# Patient Record
(demographics unavailable — no encounter records)

---
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
## 2019-11-22 NOTE — EMERGENCY DEPARTMENT REPORT
<ANUSHA MCMAHON - Last Filed: 11/22/19 14:35>





ED Psych HPI





- General


Chief Complaint: Psych


Stated Complaint: PYSCH EVAL


Time Seen by Provider: 11/22/19 12:01





- Related Data


                                    Allergies











Allergy/AdvReac Type Severity Reaction Status Date / Time


 


No Known Allergies Allergy   Unverified 11/22/19 11:23














ED Medical Decision Making





- Lab Data


Result diagrams: 


                                 11/22/19 12:21





                                 11/22/19 12:21





- Medical Decision Making


I discussed case with my colleague.  I am concerned for undiagnosed mental 

illness with previous suicide attempt, recent incarceration, homelessness, 

current SI/HI and hallucinations  differential diagnosis includes drug-induced 

psychosis, schizophrenia, depressive disorder with psychosis he is medically 

clear for psychiatric care.  I reviewed the labs obtained.





ED Disposition


Clinical Impression: 


 Suicidal thoughts, Homicidal thoughts, Hallucinations





Disposition: DC/TX-65 PSY HOSP/PSY UNIT


Condition: Stable





<MICHEL DAVILA - Last Filed: 11/22/19 15:19>





ED Psych HPI





- General


Source: patient, family


Mode of arrival: Ambulatory





- History of Present Illness


Initial Comments: 





20-year-old -American male patient without significant past medical 

history presents with complaints of SI/HI for more than 6 months.  She now 

admits to auditory and visual also.  He denies any specific plan of suicide or 

homicide and denies any particular person he wishes to harm.  Patient admits to 

suicidal attempt 2 years ago.  He denies any previous mental health history or 

being on medications.





ED Review of Systems


ROS: 


Stated complaint: PYSCH EVAL


Other details as noted in HPI





Constitutional: denies: chills, fever


Eyes: denies: eye pain, vision change


ENT: denies: ear pain


Respiratory: denies: cough, shortness of breath


Cardiovascular: denies: chest pain


Gastrointestinal: denies: abdominal pain, diarrhea


Genitourinary: denies: dysuria, frequency, hematuria


Musculoskeletal: denies: back pain


Neurological: denies: headache, weakness, numbness, paresthesias, confusion, 

abnormal gait


Psychiatric: depression, auditory hallucinations, visual hallucinations, 

homicidal thoughts, suicidal thoughts





ED Past Medical Hx





- Past Medical History


Previous Medical History?: No





- Surgical History


Past Surgical History?: No





- Social History


Smoking Status: Current Every Day Smoker


Substance Use Type: Alcohol





ED Physical Exam





- General


Limitations: No Limitations





- Head


Head exam: Present: atraumatic, normocephalic





- Eye


Eye exam: Present: normal appearance, PERRL





- Neck


Neck exam: Present: full ROM.  Absent: tenderness





- Respiratory


Respiratory exam: Present: normal lung sounds bilaterally.  Absent: respiratory 

distress





- Cardiovascular


Cardiovascular Exam: Present: regular rate, normal rhythm.  Absent: systolic 

murmur, diastolic murmur, rubs, gallop





- GI/Abdominal


GI/Abdominal exam: Present: soft.  Absent: distended, tenderness





- Rectal


Rectal exam: Present: deferred





- Extremities Exam


Extremities exam: Absent: pedal edema





- Back Exam


Back exam: Present: full ROM





- Neurological Exam


Neurological exam: Present: alert, oriented X3, normal gait





- Psychiatric


Psychiatric exam: Present: homicidal ideation, suicidal ideation.  Absent: 

normal affect (inappropriate laughing noted)





- Skin


Skin exam: Present: warm, dry, intact, normal color.  Absent: rash, cyanosis, 

diaphoretic





ED Course


                                   Vital Signs











  11/22/19 11/22/19 11/22/19





  11:31 11:33 13:00


 


Temperature 98.2 F  98.4 F


 


Pulse Rate 91 H  72


 


Respiratory 20  18





Rate   


 


Blood Pressure 159/85  


 


Blood Pressure   142/75





[Left]   


 


O2 Sat by Pulse  100 100





Oximetry   














ED Medical Decision Making





- Lab Data


Result diagrams: 


                                 11/22/19 12:21





                                 11/22/19 12:21








                                   Lab Results











  11/22/19 11/22/19 11/22/19 Range/Units





  12:09 12:09 12:21 


 


WBC     (4.5-11.0)  K/mm3


 


RBC     (3.65-5.03)  M/mm3


 


Hgb     (11.8-15.2)  gm/dl


 


Hct     (35.5-45.6)  %


 


MCV     (84-94)  fl


 


MCH     (28-32)  pg


 


MCHC     (32-34)  %


 


RDW     (13.2-15.2)  %


 


Plt Count     (140-440)  K/mm3


 


Sodium    140  (137-145)  mmol/L


 


Potassium    3.4 L  (3.6-5.0)  mmol/L


 


Chloride    105.3  ()  mmol/L


 


Carbon Dioxide    21 L  (22-30)  mmol/L


 


Anion Gap    17  mmol/L


 


BUN    14  (9-20)  mg/dL


 


Creatinine    1.0  (0.8-1.5)  mg/dL


 


Estimated GFR    > 60  ml/min


 


BUN/Creatinine Ratio    14  %


 


Glucose    102 H  ()  mg/dL


 


Calcium    9.3  (8.4-10.2)  mg/dL


 


Total Bilirubin    0.30  (0.1-1.2)  mg/dL


 


AST    34  (5-40)  units/L


 


ALT    29  (7-56)  units/L


 


Alkaline Phosphatase    90  ()  units/L


 


Total Protein    7.2  (6.3-8.2)  g/dL


 


Albumin    4.1  (3.9-5)  g/dL


 


Albumin/Globulin Ratio    1.3  %


 


Urine Color  Colorless    (Yellow)  


 


Urine Turbidity  Clear    (Clear)  


 


Urine pH  7.0    (5.0-7.0)  


 


Ur Specific Gravity  1.002 L    (1.003-1.030)  


 


Urine Protein  <15 mg/dl    (Negative)  mg/dL


 


Urine Glucose (UA)  Neg    (Negative)  mg/dL


 


Urine Ketones  Neg    (Negative)  mg/dL


 


Urine Blood  Neg    (Negative)  


 


Urine Nitrite  Neg    (Negative)  


 


Urine Bilirubin  Neg    (Negative)  


 


Urine Urobilinogen  < 2.0    (<2.0)  mg/dL


 


Ur Leukocyte Esterase  Tr    (Negative)  


 


Urine WBC (Auto)  2.0    (0.0-6.0)  /HPF


 


Urine RBC (Auto)  1.0    (0.0-6.0)  /HPF


 


Urine Bacteria (Auto)  1+    (Negative)  /HPF


 


Salicylates     (2.8-20.0)  mg/dL


 


Urine Opiates Screen   Presumptive negative   


 


Urine Methadone Screen   Presumptive negative   


 


Acetaminophen     (10.0-30.0)  ug/mL


 


Ur Barbiturates Screen   Presumptive negative   


 


Ur Phencyclidine Scrn   Presumptive negative   


 


Ur Amphetamines Screen   Presumptive negative   


 


U Benzodiazepines Scrn   Presumptive negative   


 


Urine Cocaine Screen   Presumptive negative   


 


U Marijuana (THC) Screen   Presumptive negative   


 


Drugs of Abuse Note   Disclamer   


 


Plasma/Serum Alcohol     (0-0.07)  %














  11/22/19 11/22/19 11/22/19 Range/Units





  12:21 12:21 12:21 


 


WBC     (4.5-11.0)  K/mm3


 


RBC     (3.65-5.03)  M/mm3


 


Hgb     (11.8-15.2)  gm/dl


 


Hct     (35.5-45.6)  %


 


MCV     (84-94)  fl


 


MCH     (28-32)  pg


 


MCHC     (32-34)  %


 


RDW     (13.2-15.2)  %


 


Plt Count     (140-440)  K/mm3


 


Sodium     (137-145)  mmol/L


 


Potassium     (3.6-5.0)  mmol/L


 


Chloride     ()  mmol/L


 


Carbon Dioxide     (22-30)  mmol/L


 


Anion Gap     mmol/L


 


BUN     (9-20)  mg/dL


 


Creatinine     (0.8-1.5)  mg/dL


 


Estimated GFR     ml/min


 


BUN/Creatinine Ratio     %


 


Glucose     ()  mg/dL


 


Calcium     (8.4-10.2)  mg/dL


 


Total Bilirubin     (0.1-1.2)  mg/dL


 


AST     (5-40)  units/L


 


ALT     (7-56)  units/L


 


Alkaline Phosphatase     ()  units/L


 


Total Protein     (6.3-8.2)  g/dL


 


Albumin     (3.9-5)  g/dL


 


Albumin/Globulin Ratio     %


 


Urine Color     (Yellow)  


 


Urine Turbidity     (Clear)  


 


Urine pH     (5.0-7.0)  


 


Ur Specific Gravity     (1.003-1.030)  


 


Urine Protein     (Negative)  mg/dL


 


Urine Glucose (UA)     (Negative)  mg/dL


 


Urine Ketones     (Negative)  mg/dL


 


Urine Blood     (Negative)  


 


Urine Nitrite     (Negative)  


 


Urine Bilirubin     (Negative)  


 


Urine Urobilinogen     (<2.0)  mg/dL


 


Ur Leukocyte Esterase     (Negative)  


 


Urine WBC (Auto)     (0.0-6.0)  /HPF


 


Urine RBC (Auto)     (0.0-6.0)  /HPF


 


Urine Bacteria (Auto)     (Negative)  /HPF


 


Salicylates  < 0.3 L    (2.8-20.0)  mg/dL


 


Urine Opiates Screen     


 


Urine Methadone Screen     


 


Acetaminophen   < 5.0 L   (10.0-30.0)  ug/mL


 


Ur Barbiturates Screen     


 


Ur Phencyclidine Scrn     


 


Ur Amphetamines Screen     


 


U Benzodiazepines Scrn     


 


Urine Cocaine Screen     


 


U Marijuana (THC) Screen     


 


Drugs of Abuse Note     


 


Plasma/Serum Alcohol    < 0.01  (0-0.07)  %














  11/22/19 Range/Units





  12:21 


 


WBC  7.6  (4.5-11.0)  K/mm3


 


RBC  4.42  (3.65-5.03)  M/mm3


 


Hgb  13.5  (11.8-15.2)  gm/dl


 


Hct  40.0  (35.5-45.6)  %


 


MCV  91  (84-94)  fl


 


MCH  31  (28-32)  pg


 


MCHC  34  (32-34)  %


 


RDW  14.3  (13.2-15.2)  %


 


Plt Count  276  (140-440)  K/mm3


 


Sodium   (137-145)  mmol/L


 


Potassium   (3.6-5.0)  mmol/L


 


Chloride   ()  mmol/L


 


Carbon Dioxide   (22-30)  mmol/L


 


Anion Gap   mmol/L


 


BUN   (9-20)  mg/dL


 


Creatinine   (0.8-1.5)  mg/dL


 


Estimated GFR   ml/min


 


BUN/Creatinine Ratio   %


 


Glucose   ()  mg/dL


 


Calcium   (8.4-10.2)  mg/dL


 


Total Bilirubin   (0.1-1.2)  mg/dL


 


AST   (5-40)  units/L


 


ALT   (7-56)  units/L


 


Alkaline Phosphatase   ()  units/L


 


Total Protein   (6.3-8.2)  g/dL


 


Albumin   (3.9-5)  g/dL


 


Albumin/Globulin Ratio   %


 


Urine Color   (Yellow)  


 


Urine Turbidity   (Clear)  


 


Urine pH   (5.0-7.0)  


 


Ur Specific Gravity   (1.003-1.030)  


 


Urine Protein   (Negative)  mg/dL


 


Urine Glucose (UA)   (Negative)  mg/dL


 


Urine Ketones   (Negative)  mg/dL


 


Urine Blood   (Negative)  


 


Urine Nitrite   (Negative)  


 


Urine Bilirubin   (Negative)  


 


Urine Urobilinogen   (<2.0)  mg/dL


 


Ur Leukocyte Esterase   (Negative)  


 


Urine WBC (Auto)   (0.0-6.0)  /HPF


 


Urine RBC (Auto)   (0.0-6.0)  /HPF


 


Urine Bacteria (Auto)   (Negative)  /HPF


 


Salicylates   (2.8-20.0)  mg/dL


 


Urine Opiates Screen   


 


Urine Methadone Screen   


 


Acetaminophen   (10.0-30.0)  ug/mL


 


Ur Barbiturates Screen   


 


Ur Phencyclidine Scrn   


 


Ur Amphetamines Screen   


 


U Benzodiazepines Scrn   


 


Urine Cocaine Screen   


 


U Marijuana (THC) Screen   


 


Drugs of Abuse Note   


 


Plasma/Serum Alcohol   (0-0.07)  %














- Medical Decision Making





20-year-old male patient here today for S/H.  History of attempted suicide 2 

years ago per patient.  Patient otherwise denies any previous mental health 

issues.  Nurse states she spoke to the patient's mother who informed her patient

 recently released from incarceration 2 days ago and now sleeping in his car.  

Patient placed on 1013.  Labs are without acute findings.  Vitals are WNL.  

Patient is medically clear for psychiatric care. 


Critical care attestation.: 


If time is entered above; I have spent that time in minutes in the direct care 

of this critically ill patient, excluding procedure time.








ED Disposition


Is pt being admited?: No

## 2019-11-23 NOTE — CONSULTATION
History of Present Illness





- Reason for Consult


Consult date: 11/23/19


Reason for consult: Mental Health Evaluation


Requesting physician: ANUSHA MCMAHON





- Chief Complaint


Chief complaint: 


" I see what you see"





  


  











- History of Present Psychiatric Illness





 This is a 20 year old  male that presented to the ER with 

suicidal thoughts, auditory and visual hallucinations. Assessment is difficult. 

Patient is laughing inappropriately, he is responding to internal stimuli, he 

appears to be somewhat restless, not agitated. He endorses hallucinations, 

unable to answer questions regarding suicidality and drug or substance use. 





Medications and Allergies


                                    Allergies











Allergy/AdvReac Type Severity Reaction Status Date / Time


 


No Known Allergies Allergy   Unverified 11/22/19 11:23











                                Home Medications











 Medication  Instructions  Recorded  Confirmed  Last Taken  Type


 


No Known Home Medications [No  11/22/19 11/22/19 Unknown History





Reported Home Medications]     











Active Meds: 


Active Medications





Risperidone (Risperdal Oral Liqd)  1 mg PO BID SUNSHINE











Past psychiatric history





- Past Medical History


Past Surgical History: No surgical history





Mental Status Exam





- Vital signs


                                Last Vital Signs











Temp  97.9 F   11/23/19 08:56


 


Pulse  60   11/23/19 08:56


 


Resp  13   11/23/19 08:56


 


BP  113/58   11/23/19 08:56


 


Pulse Ox  100   11/23/19 08:56














- Exam


Orientation: person


Affect: anxious


Mood: euphoric


Thought Process: Disorganized


Perceptions: visual, auditory, hallucinations


Concentration: unable to pay attention


Motor activity: restless





Results


Result Diagrams: 


                                 11/22/19 12:21





                                 11/22/19 12:21


All other labs normal.








Assessment and Plan


Assessment and plan: 





Impression: 21 y/o male with acute psychosis, unspecified at this time





Ddx:  Schizophrenia, bipolar disorder, MDD with psychotic features, substance 

induced psychosis


         





Assessment and Plan


  Continue 1013 at this time. Patient is hallucinating more and more restless.  

He is psychotic at this time and requires further evaluation and treatment


   at this time. 


   


   We will prescribe 1 mg risperdal BID





  We will refer patient for inpatient status at this time. 








Staffed with Dr. MILLIE Larry MD
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
